# Patient Record
Sex: MALE | Race: WHITE
[De-identification: names, ages, dates, MRNs, and addresses within clinical notes are randomized per-mention and may not be internally consistent; named-entity substitution may affect disease eponyms.]

---

## 2019-10-02 ENCOUNTER — HOSPITAL ENCOUNTER (OUTPATIENT)
Dept: HOSPITAL 62 - RAD | Age: 74
End: 2019-10-02
Attending: FAMILY MEDICINE
Payer: COMMERCIAL

## 2019-10-02 DIAGNOSIS — J44.9: Primary | ICD-10-CM

## 2019-10-02 DIAGNOSIS — R91.8: ICD-10-CM

## 2019-10-02 PROCEDURE — 71250 CT THORAX DX C-: CPT

## 2019-10-02 NOTE — RADIOLOGY REPORT (SQ)
EXAM DESCRIPTION:  CT CHEST WITHOUT



COMPLETED DATE/TIME:  10/2/2019 12:44 pm



REASON FOR STUDY:  J44.9 CHRONIC OBSTRUCTIVE PULMONARY DISEASE, UNSPECIFIED J44.9  CHRONIC OBSTRUCTIV
E PULMONARY DISEASE, UNSPECIFIED



COMPARISON:  3/27/2019



TECHNIQUE:  CT scan performed of the chest without intravenous contrast.  Images reviewed with lung, 
soft tissue and bone windows.  Reconstructed coronal and sagittal MPR images reviewed.  All images st
ored on PACS.

All CT scanners at this facility use dose modulation, iterative reconstruction, and/or weight based d
osing when appropriate to reduce radiation dose to as low as reasonably achievable (ALARA).

[facility]: Dose Right  [facility]: CareDose    [facility]: Dose Right    [facility]: Teradose 4D    [facility]: Smart Technologies



RADIATION DOSE:  CT Rad equipment meets quality standard of care and radiation dose reduction techniq
ues were employed. CTDIvol: 5.0 mGy. DLP: 209 mGy-cm. mGy.



LIMITATIONS:  No technical limitations.



FINDINGS:  LUNGS AND PLEURA: Bilateral emphysematous changes are stable.  Findings are most marked in
 the upper lobes.  There is simplification of the lung architecture.  There is scarring in both apice
s.  There persistent subpleural nodules and ground-glass opacity in the left lower lobe.  The ground-
glass opacity measures just under 9 mm in diameter and is not significantly changed from prior study 
allowing for slight scan variance.  Smaller sub solid nodules in the periphery of the left lower lobe
 are stable or decreased in size.  No consolidation.  No pleural effusions.  No new nodules.  Calcifi
ed granulomas are again noted.

HILAR AND MEDIASTINAL STRUCTURES: No identified masses or abnormal nodes.  No obvious aneurysm.

HEART AND VASCULAR STRUCTURES: No aneurysm.  No pericardial effusion.

UPPER ABDOMEN: Calcified splenic and hepatic granulomas.

THYROID AND OTHER SOFT TISSUES: No masses.  No adenopathy.

BONES: No significant finding.

HARDWARE: None in the chest.

OTHER: No other significant findings.



IMPRESSION:  Small stable left lower lobe subpleural pulmonary nodules as described.  The largest rustam
sures just under 9 mm in diameter it has a ground-glass configuration.  The smaller nodules are subso
lid and under less than 5 mm in diameter.  Please see below for recommended follow-up.



COMMENT:   Fleischner Criteria for Ground Glass Nodules:

>6mm ground glass single nodule: CT 6-12 mo, then CT every 2 yr until 5 yr



TECHNICAL DOCUMENTATION:  JOB ID:  [id number]

Quality ID # [id number]: Final reports with documentation of one or more dose reduction techniques (e.g., Au
tomated exposure control, adjustment of the mA and/or kV according to patient size, use of iterative 
reconstruction technique)

 2011 [employer]- All Rights Reserved



Reading location - IP/workstation name: [email or web address]